# Patient Record
Sex: MALE | NOT HISPANIC OR LATINO | URBAN - METROPOLITAN AREA
[De-identification: names, ages, dates, MRNs, and addresses within clinical notes are randomized per-mention and may not be internally consistent; named-entity substitution may affect disease eponyms.]

---

## 2017-01-20 ENCOUNTER — IMPORTED ENCOUNTER (OUTPATIENT)
Dept: URBAN - METROPOLITAN AREA CLINIC 1 | Facility: CLINIC | Age: 82
End: 2017-01-20

## 2017-01-20 PROBLEM — H25.812: Noted: 2017-01-20

## 2017-01-20 PROCEDURE — 92136 OPHTHALMIC BIOMETRY: CPT

## 2017-01-20 NOTE — PATIENT DISCUSSION
1.  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 The patient stated a full understanding and a desire to proceed with the procedure. The patient will need to start pre-operative eye drops as directed. Proceed w/ phaco PCL OSPt understands they will need glasses post-op to achieve their best vision. 2.  Return for an appointment for sx OS with Dr. Kevon Pickens.

## 2017-02-01 ENCOUNTER — IMPORTED ENCOUNTER (OUTPATIENT)
Dept: URBAN - METROPOLITAN AREA CLINIC 1 | Facility: CLINIC | Age: 82
End: 2017-02-01

## 2017-02-02 ENCOUNTER — IMPORTED ENCOUNTER (OUTPATIENT)
Dept: URBAN - METROPOLITAN AREA CLINIC 1 | Facility: CLINIC | Age: 82
End: 2017-02-02

## 2017-02-02 PROBLEM — Z96.1: Noted: 2017-02-02

## 2017-02-02 PROCEDURE — 99024 POSTOP FOLLOW-UP VISIT: CPT

## 2017-02-02 NOTE — PATIENT DISCUSSION
POD#1 CE/IOL OS (Standard) doing well. Continue all 3 gtts as prescribed and until gone. Use Lotemax BID OS Prolensa Qdaily OS Ocuflox TID OS Post op Warnings Reiterated RTC as scheduled

## 2017-02-07 ENCOUNTER — IMPORTED ENCOUNTER (OUTPATIENT)
Dept: URBAN - METROPOLITAN AREA CLINIC 1 | Facility: CLINIC | Age: 82
End: 2017-02-07

## 2017-02-07 PROBLEM — Z96.1: Noted: 2017-02-07

## 2017-02-07 PROBLEM — H25.811: Noted: 2017-02-07

## 2017-02-07 PROCEDURE — 92136 OPHTHALMIC BIOMETRY: CPT

## 2017-02-15 ENCOUNTER — IMPORTED ENCOUNTER (OUTPATIENT)
Dept: URBAN - METROPOLITAN AREA CLINIC 1 | Facility: CLINIC | Age: 82
End: 2017-02-15

## 2017-02-16 ENCOUNTER — IMPORTED ENCOUNTER (OUTPATIENT)
Dept: URBAN - METROPOLITAN AREA CLINIC 1 | Facility: CLINIC | Age: 82
End: 2017-02-16

## 2017-02-16 PROBLEM — Z96.1: Noted: 2017-02-16

## 2017-02-16 PROCEDURE — 99024 POSTOP FOLLOW-UP VISIT: CPT

## 2017-02-16 NOTE — PATIENT DISCUSSION
1. POD#1 CE/IOL OD (Standard)  doing well. Continue all 3 gtts as prescribed and until gone. Use Lotemax BID OD Prolensa Qdaily OD Ocuflox TID OD Post op Warnings Reiterated 2. POW#2  CE/IOL OS (Standard) doing well.   Use Lotemax BID OS till out Use Prolensa Qdaily OS till out RTC as scheduled

## 2017-03-10 ENCOUNTER — IMPORTED ENCOUNTER (OUTPATIENT)
Dept: URBAN - METROPOLITAN AREA CLINIC 1 | Facility: CLINIC | Age: 82
End: 2017-03-10

## 2017-03-10 PROBLEM — Z96.1: Noted: 2017-03-10

## 2017-03-10 PROCEDURE — 99024 POSTOP FOLLOW-UP VISIT: CPT

## 2017-03-10 NOTE — PATIENT DISCUSSION
1. POW#3 Phaco/ PCL OD PCL OS (Standard OU) Finish PO meds per schedule MRX for glasses givenReturn for an appointment in 4 months 30/OCT/VF with Dr. Froy Pablo.

## 2017-04-20 ENCOUNTER — IMPORTED ENCOUNTER (OUTPATIENT)
Dept: URBAN - METROPOLITAN AREA CLINIC 1 | Facility: CLINIC | Age: 82
End: 2017-04-20

## 2017-04-20 PROBLEM — H01.002: Noted: 2017-04-20

## 2017-04-20 PROBLEM — H04.123: Noted: 2017-04-20

## 2017-04-20 PROBLEM — H01.005: Noted: 2017-04-20

## 2017-04-20 PROBLEM — Z96.1: Noted: 2017-04-20

## 2017-04-20 PROBLEM — H16.143: Noted: 2017-04-20

## 2017-04-20 PROBLEM — H17.12: Noted: 2017-04-20

## 2017-04-20 PROBLEM — H40.023: Noted: 2017-04-20

## 2017-04-20 PROCEDURE — 92012 INTRM OPH EXAM EST PATIENT: CPT

## 2017-04-20 NOTE — PATIENT DISCUSSION
1.  JEAN w/ PEK OU- The use of artificial tears OU TID were recommended. 2.  Blepharitis posterior type OU- The start of daily warm compresses x 5 minutes and lid scrubs were recommended daily. 3. Central Corneal Scar OS- Oberve. 4.  Pseudophakia OU (Standard OU)- Doing well. 5.  Glaucoma Suspect OU (0.8 OU): Stable IOP OU. W/u after phaco. Patient is considered high risk. Condition was discussed with patient and patient understands. Will continue to monitor patient for any progression in condition. Patient was advised to call us with any problems questions or concerns.  6.  RTC as scheduled in July

## 2017-07-07 ENCOUNTER — IMPORTED ENCOUNTER (OUTPATIENT)
Dept: URBAN - METROPOLITAN AREA CLINIC 1 | Facility: CLINIC | Age: 82
End: 2017-07-07

## 2017-07-07 PROBLEM — H40.1131: Noted: 2017-07-07

## 2017-07-07 PROBLEM — Z96.1: Noted: 2017-07-07

## 2017-07-07 PROBLEM — H04.123: Noted: 2017-07-07

## 2017-07-07 PROBLEM — H16.143: Noted: 2017-07-07

## 2017-07-07 PROBLEM — H01.005: Noted: 2017-07-07

## 2017-07-07 PROBLEM — H17.12: Noted: 2017-07-07

## 2017-07-07 PROBLEM — H01.002: Noted: 2017-07-07

## 2017-07-07 PROCEDURE — 92133 CPTRZD OPH DX IMG PST SGM ON: CPT

## 2017-07-07 PROCEDURE — 92014 COMPRE OPH EXAM EST PT 1/>: CPT

## 2017-07-07 PROCEDURE — 92083 EXTENDED VISUAL FIELD XM: CPT

## 2017-07-07 NOTE — PATIENT DISCUSSION
1.  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 Moderate thinning by OCT OU. HVF defects OU w/ progression OU. Patient to start monotrial of Travatan Z OD (sample given.) Patient advised to be compliant with gtts. Condition was discussed with patient and patient understands. Will continue to monitor patient for any progression in condition. Patient was advised to call us with any problems questions or concerns. 2.  JEAN w/ PEK OU- The continuation of artificial tears were recommended. 3.  Blepharitis posterior type OU- Improving. Conitnue daily warm compresses and lid scrubs were recommended. 4. Pseudophakia OU- Doing well. 5. Central and peripheral corneal scars OS- Observe. 6. Return for an appointment for a 10/check IOP on monotrial in 6-8 weeks with Dr. Allison Damon.

## 2017-09-07 ENCOUNTER — IMPORTED ENCOUNTER (OUTPATIENT)
Dept: URBAN - METROPOLITAN AREA CLINIC 1 | Facility: CLINIC | Age: 82
End: 2017-09-07

## 2017-09-07 PROBLEM — H40.1131: Noted: 2017-09-07

## 2017-09-07 PROCEDURE — 99213 OFFICE O/P EST LOW 20 MIN: CPT

## 2017-09-07 NOTE — PATIENT DISCUSSION
1.  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 Use Latanoprost QHS OU. Compliance with meds. 2.  JEAN w/ PEK OU- Cont ATs BID OU routinely. 3.  Anterior Blepharitise OU- Improving. Cont daily warm compresses and lid scrubs were recommended. 4. Pseudophakia OU- Doing well. 5. Central and peripheral corneal scars OS- Observe. Return for an appointment in 6 mo 10 with Dr. José Miguel Urias.

## 2018-03-06 ENCOUNTER — IMPORTED ENCOUNTER (OUTPATIENT)
Dept: URBAN - METROPOLITAN AREA CLINIC 1 | Facility: CLINIC | Age: 83
End: 2018-03-06

## 2018-03-06 PROBLEM — H40.1131: Noted: 2018-03-06

## 2018-03-06 PROBLEM — H17.822: Noted: 2018-03-06

## 2018-03-06 PROBLEM — Z96.1: Noted: 2018-03-06

## 2018-03-06 PROBLEM — H04.123: Noted: 2018-03-06

## 2018-03-06 PROBLEM — H16.143: Noted: 2018-03-06

## 2018-03-06 PROCEDURE — 99213 OFFICE O/P EST LOW 20 MIN: CPT

## 2018-03-06 NOTE — PATIENT DISCUSSION
1.  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 Patient to continue with Latanoprost OU QHS. Patient advised to be compliant with gtts. Condition was discussed with patient and patient understands. Will continue to monitor patient for any progression in condition. Patient was advised to call us with any problems questions or concerns. 2.  JEAN w/ PEK OU- Stable. The continuation of artificial tears were recommended. 3.  central and Peripheral Corneal Scars OS- Observe. 4.  Pseudophakia OU- Doing well. 5. Return for an appointment for a 30 OCT in 6 months with Dr. Brigido Hemphill.

## 2018-11-21 ENCOUNTER — IMPORTED ENCOUNTER (OUTPATIENT)
Dept: URBAN - METROPOLITAN AREA CLINIC 1 | Facility: CLINIC | Age: 83
End: 2018-11-21

## 2018-11-21 PROBLEM — H40.1131: Noted: 2018-11-21

## 2018-11-21 PROBLEM — H04.123: Noted: 2018-11-21

## 2018-11-21 PROBLEM — Z96.1: Noted: 2018-11-21

## 2018-11-21 PROBLEM — H17.822: Noted: 2018-11-21

## 2018-11-21 PROBLEM — H16.143: Noted: 2018-11-21

## 2018-11-21 PROCEDURE — 92133 CPTRZD OPH DX IMG PST SGM ON: CPT

## 2018-11-21 PROCEDURE — 92014 COMPRE OPH EXAM EST PT 1/>: CPT

## 2018-11-21 NOTE — PATIENT DISCUSSION
1.  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 No progression by OCT OU. Patient to continue with Latanoprost OU QHS. Patient advised to be compliant with gtts. Condition was discussed with patient and patient understands. Will continue to monitor patient for any progression in condition. Patient was advised to call us with any problems questions or concerns. 2.  JEAN w/ PEK OU- Stable. The continuation of artificial tears were recommended. 3.  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 Observe. 4.  Pseudophakia OU- Doing well. 5. Return for an appointment for 10/[facility] in 6 months with Dr. Elvira Schuler.

## 2019-05-30 ENCOUNTER — IMPORTED ENCOUNTER (OUTPATIENT)
Dept: URBAN - METROPOLITAN AREA CLINIC 1 | Facility: CLINIC | Age: 84
End: 2019-05-30

## 2019-05-30 PROBLEM — Z96.1: Noted: 2019-05-30

## 2019-05-30 PROBLEM — H04.123: Noted: 2019-05-30

## 2019-05-30 PROBLEM — H17.822: Noted: 2019-05-30

## 2019-05-30 PROBLEM — H17.12: Noted: 2019-05-30

## 2019-05-30 PROBLEM — H16.143: Noted: 2019-05-30

## 2019-05-30 PROBLEM — H40.1131: Noted: 2019-05-30

## 2019-05-30 PROCEDURE — 92083 EXTENDED VISUAL FIELD XM: CPT

## 2019-05-30 PROCEDURE — 92012 INTRM OPH EXAM EST PATIENT: CPT

## 2019-05-30 NOTE — PATIENT DISCUSSION
1.  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 No progression by HVF today OU. Patient to continue with Latanoprost OU QHS. (erx today) Patient advised to be compliant with gtts. Condition was discussed with patient and patient understands. Will continue to monitor patient for any progression in condition. Patient was advised to call us with any problems questions or concerns. 2.  JEAN w/ PEK OU- Stable. The continuation of artificial tears were recommended. 3.  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 Observe. 4.  Pseudophakia OU- Doing well. Return for an appointment in 6 months for a 30/OCT with Dr. Piotr Byers.

## 2022-04-02 ASSESSMENT — TONOMETRY
OS_IOP_MMHG: 17
OS_IOP_MMHG: 16
OS_IOP_MMHG: 17
OD_IOP_MMHG: 10
OD_IOP_MMHG: 12
OS_IOP_MMHG: 11
OD_IOP_MMHG: 13
OD_IOP_MMHG: 16
OS_IOP_MMHG: 12
OD_IOP_MMHG: 16
OD_IOP_MMHG: 17
OS_IOP_MMHG: 16
OS_IOP_MMHG: 12
OD_IOP_MMHG: 16
OS_IOP_MMHG: 12
OD_IOP_MMHG: 11
OS_IOP_MMHG: 12
OS_IOP_MMHG: 12

## 2022-04-02 ASSESSMENT — VISUAL ACUITY
OD_CC: 20/25
OS_CC: 20/20
OD_CC: 20/25
OS_CC: 20/25-2
OS_SC: 20/25
OS_CC: 20/40
OD_CC: 20/40
OD_CC: 20/30
OD_CC: 20/20
OD_GLARE: 20/80
OS_CC: 20/20
OS_CC: 20/30
OD_CC: 20/25
OD_CC: 20/20
OD_CC: 20/20
OD_SC: 20/25
OS_CC: 20/25
OS_CC: 20/25
OS_CC: 20/25-2
OS_CC: 20/30-2

## 2024-11-05 NOTE — PATIENT DISCUSSION
1.  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 The patient stated a full understanding and a desire to proceed with the procedure. Pt understands they will need glasses post-op to achieve their best vision. Pt states he only wants what insurance covers and is ok with glasses post phaco.  Phaco PCL OD Standard lens standard technique. 2.  POW#2  CE/IOL OS (Standard) doing well.   Use Lotemax BID OS till out Use Prolensa Qdaily OS till out Detail Level: Detailed Quality 130: Documentation Of Current Medications In The Medical Record: Current Medications Documented Quality 47: Advance Care Plan: Advance Care Planning discussed and documented; advance care plan or surrogate decision maker documented in the medical record. Quality 226: Preventive Care And Screening: Tobacco Use: Screening And Cessation Intervention: Patient screened for tobacco use and is an ex/non-smoker